# Patient Record
(demographics unavailable — no encounter records)

---

## 2017-04-26 RX ORDER — TAMSULOSIN HYDROCHLORIDE 0.4 MG/1
CAPSULE ORAL
Qty: 90 CAPSULE | Refills: 3 | Status: SHIPPED | OUTPATIENT
Start: 2017-04-26 | End: 2018-04-19 | Stop reason: SDUPTHER

## 2018-04-19 NOTE — TELEPHONE ENCOUNTER
----- Message from Trang Hancock sent at 4/19/2018  9:32 AM CDT -----  Contact: Jaylene Bautista  Calling to request a refill on   Rx tamsulosin (FLOMAX) 0.4 mg Cp24  90 qty    [facility] #[id number] - [place] - [address] AT [facility]  [address] [address]  Phone: [phone] Fax: [phone]    thanks

## 2018-04-20 RX ORDER — FINASTERIDE 5 MG/1
5 TABLET, FILM COATED ORAL DAILY
Qty: 90 TABLET | Refills: 3 | Status: SHIPPED | OUTPATIENT
Start: 2018-04-20 | End: 2019-04-20

## 2018-04-20 RX ORDER — TAMSULOSIN HYDROCHLORIDE 0.4 MG/1
1 CAPSULE ORAL DAILY
Qty: 90 CAPSULE | Refills: 3 | Status: SHIPPED | OUTPATIENT
Start: 2018-04-20